# Patient Record
Sex: MALE | Race: WHITE | HISPANIC OR LATINO | Employment: FULL TIME | URBAN - METROPOLITAN AREA
[De-identification: names, ages, dates, MRNs, and addresses within clinical notes are randomized per-mention and may not be internally consistent; named-entity substitution may affect disease eponyms.]

---

## 2017-11-06 ENCOUNTER — HOSPITAL ENCOUNTER (EMERGENCY)
Facility: MEDICAL CENTER | Age: 22
End: 2017-11-06
Attending: EMERGENCY MEDICINE

## 2017-11-06 VITALS
SYSTOLIC BLOOD PRESSURE: 131 MMHG | BODY MASS INDEX: 18.58 KG/M2 | HEART RATE: 92 BPM | RESPIRATION RATE: 16 BRPM | TEMPERATURE: 98.8 F | HEIGHT: 68 IN | DIASTOLIC BLOOD PRESSURE: 72 MMHG | OXYGEN SATURATION: 100 % | WEIGHT: 122.58 LBS

## 2017-11-06 DIAGNOSIS — B35.6 TINEA CRURIS: ICD-10-CM

## 2017-11-06 DIAGNOSIS — B35.4 TINEA CORPORIS: ICD-10-CM

## 2017-11-06 PROCEDURE — 99283 EMERGENCY DEPT VISIT LOW MDM: CPT

## 2017-11-06 RX ORDER — FLUCONAZOLE 200 MG/1
200 TABLET ORAL DAILY
Qty: 4 TAB | Refills: 0 | Status: SHIPPED | OUTPATIENT
Start: 2017-11-06 | End: 2022-01-28

## 2017-11-07 NOTE — DISCHARGE INSTRUCTIONS
Jock Itch  Jock itch is an infection of the skin in the groin area. It is caused by a type of germ (fungus). The infection causes a rash and itching in the groin and upper thigh. It is common in people who play sports. Being in places with hot weather and wearing tight or wet clothes can increase the chance of getting jock itch. The rash usually goes away in 2-3 weeks with treatment.  HOME CARE  · Take medicines only as told by your doctor. Apply skin creams or ointments exactly as told.  · Wear loose-fitting clothing.  ¨ Men should wear cotton boxer shorts.  ¨ Women should wear cotton underwear.  · Change your underwear every day to keep your groin dry.  · Avoid hot baths.  · Dry your groin area well after you take a bath or shower.  ¨ Use a separate towel to dry your groin area. This will help to prevent a spreading of the infection to other areas of your body.  · Do not scratch the affected area.  · Do not share towels with other people.  GET HELP IF:  · Your rash does not improve or it gets worse after 2 weeks of treatment.  · Your rash is spreading.  · Your rash comes back after treatment is finished.  · You have a fever.  · You have redness, swelling, or pain in the area around your rash.  · You have fluid, blood, or pus coming from your rash.  · Your have your rash for more than 4 weeks.     This information is not intended to replace advice given to you by your health care provider. Make sure you discuss any questions you have with your health care provider.     Document Released: 03/14/2011 Document Revised: 01/08/2016 Document Reviewed: 09/29/2015  [employer] Interactive Patient Education ©2016 [employer]  Body Ringworm  Ringworm (tinea corporis) is a fungal infection of the skin on the body. This infection is not caused by worms, but is actually caused by a fungus. Fungus normally lives on the top of your skin and can be useful. However, in the case of ringworms, the fungus grows out of control and  causes a skin infection. It can involve any area of skin on the body and can spread easily from one person to another (contagious). Ringworm is a common problem for children, but it can affect adults as well. Ringworm is also often found in athletes, especially wrestlers who share equipment and mats.   CAUSES   Ringworm of the body is caused by a fungus called dermatophyte. It can spread by:  · Touching other people who are infected.  · Touching infected pets.  · Touching or sharing objects that have been in contact with the infected person or pet (hats, ellis, towels, clothing, sports equipment).  SYMPTOMS   · Itchy, raised red spots and bumps on the skin.  · Ring-shaped rash.  · Redness near the border of the rash with a clear center.  · Dry and scaly skin on or around the rash.  Not every person develops a ring-shaped rash. Some develop only the red, scaly patches.  DIAGNOSIS   Most often, ringworm can be diagnosed by performing a skin exam. Your caregiver may choose to take a skin scraping from the affected area. The sample will be examined under the microscope to see if the fungus is present.   TREATMENT   Body ringworm may be treated with a topical antifungal cream or ointment. Sometimes, an antifungal shampoo that can be used on your body is prescribed. You may be prescribed antifungal medicines to take by mouth if your ringworm is severe, keeps coming back, or lasts a long time.   HOME CARE INSTRUCTIONS   · Only take over-the-counter or prescription medicines as directed by your caregiver.  · Wash the infected area and dry it completely before applying your cream or ointment.  · When using antifungal shampoo to treat the ringworm, leave the shampoo on the body for 3-5 minutes before rinsing.     · Wear loose clothing to stop clothes from rubbing and irritating the rash.  · Wash or change your bed sheets every night while you have the rash.  · Have your pet treated by your  if it has the same  infection.  To prevent ringworm:   · Practice good hygiene.  · Wear sandals or shoes in public places and showers.  · Do not share personal items with others.  · Avoid touching red patches of skin on other people.  · Avoid touching pets that have bald spots or wash your hands after doing so.  SEEK MEDICAL CARE IF:   · Your rash continues to spread after 7 days of treatment.  · Your rash is not gone in 4 weeks.  · The area around your rash becomes red, warm, tender, and swollen.     This information is not intended to replace advice given to you by your health care provider. Make sure you discuss any questions you have with your health care provider.     Document Released: 12/15/2001 Document Revised: 09/11/2013 Document Reviewed: 07/01/2013  Elsevier Interactive Patient Education ©2016 [employer]

## 2017-11-07 NOTE — ED NOTES
Pt to triage  Chief Complaint   Patient presents with   • Rash     all over body, and to R hand, dry areas and small red raised bumps for 1 year   Pt asked to wait in lobby, pt updated on triage process and pt asked to inform RN of any changes.

## 2017-11-07 NOTE — ED PROVIDER NOTES
"ED Provider Note    Scribed for Dr. Drew Pantoja M.D. by Kade Burns. 11/6/2017  4:47 PM    Primary care provider: Pcp Pt States None  Means of arrival: Walk-In  History obtained from: Patient  History limited by: None    CHIEF COMPLAINT  Chief Complaint   Patient presents with   • Rash     all over body, and to R hand, dry areas and small red raised bumps for 1 year     HPI  Wayne Castellanos is a 21 y.o. male who presents to the Emergency Department complaining of a generalized progressively worsening pruritic rash onset 1 year ago. He said the rash originally started on his right foot and has started to spread across his arms towards bilateral hands. Now, he describes pimples to both hands. The patient originally thought the issue was due to mosquitos but is concerned because the rashes are not going away. He adds that they come in waves with the rashes alleviating and then spreading even further. He has tried using Gold Bond lotion without any relief. Denies vomiting, fever.    REVIEW OF SYSTEMS  Pertinent positives include rash, itchiness. Pertinent negatives include no vomiting, fever.  See HPI for further details.    E.    PAST MEDICAL HISTORY       SURGICAL HISTORY  patient denies any surgical history    SOCIAL HISTORY  None noted    FAMILY HISTORY  None noted    CURRENT MEDICATIONS  None noted    ALLERGIES  No Known Allergies    PHYSICAL EXAM  VITAL SIGNS: /73   Pulse (!) 101   Temp 37.1 °C (98.8 °F) (Temporal)   Resp 18   Ht 1.727 m (5' 8\")   Wt 55.6 kg (122 lb 9.2 oz)   SpO2 100%   BMI 18.64 kg/m²     Constitutional: Well developed, Well nourished, No acute distress.   .   Skin: Warm, Dry, Red, scaly lesions in between bilateral fingers and feet. Annular lesions on the trunk. Weeping scaly lesions in the groin which are all representative of tinea corporis.  Musculoskeletal: No major deformities noted.   Neurologic: Awake, alert. Moves all extremities spontaneously.  Psychiatric: Affect " normal, Mood normal.     COURSE & MEDICAL DECISION MAKING  Pertinent Labs & Imaging studies reviewed. (See chart for details)    4:47 PM - Patient seen and examined at bedside. I explained that he has a fungal infection and can be treated with a course of medication to treat his symptoms. The patient was given return precautions such as new or worsening symptoms and he understands and verbalizes agreement.    Decision Making:  I think the rash represents tinea pedis and cruise which is now spread to involve the body as well put on systemic antifungal with fluconazole    The patient will return for new or worsening symptoms and is stable at the time of discharge.    The patient is referred to a primary physician for blood pressure management, diabetic screening, and for all other preventative health concerns.    DISPOSITION:  Patient will be discharged home in stable condition.    FOLLOW UP:  [address] [address]  [phone]        OUTPATIENT MEDICATIONS:  New Prescriptions    FLUCONAZOLE (DIFLUCAN) 200 MG TAB    Take 1 Tab by mouth every day. Take only one  Tablet once a week for 4 weeks     FINAL IMPRESSION  1. Tinea corporis    2. Tinea cruris       Kade JETER (Magdiibsherri), am scribing for, and in the presence of, Drew Pantoja M.D..    Electronically signed by: Kade Burns (Jesusita), 11/6/2017    Drew JETER M.D. personally performed the services described in this documentation, as scribed by Kade Burns in my presence, and it is both accurate and complete.    The note accurately reflects work and decisions made by me.  Drew Pantoja  11/6/2017  5:58 PM

## 2017-11-10 NOTE — ED NOTES
Outpatient pharmacy called to clarify sig of fluconazole, sig clarified to fluconazole 200 mg, take 1 tablet once weekly for 4 weeks.    Teri Green, PharmD, CGP, BCPS

## 2022-01-25 ENCOUNTER — TELEPHONE (OUTPATIENT)
Dept: SCHEDULING | Facility: IMAGING CENTER | Age: 27
End: 2022-01-25

## 2022-01-25 NOTE — TELEPHONE ENCOUNTER
Left message for patient to call back regarding pre-visit planning. Please transfer call to [phone]

## 2022-01-25 NOTE — TELEPHONE ENCOUNTER
NEW PATIENT VISIT PRE-VISIT PLANNING    1.  [employer] Patient is checked in Patient Demographics?Yes    2.  Immunizations were updated in Epic using Reconcile Outside Information activity? Yes         3.  Is this appointment scheduled as a Hospital Follow-Up? No    4.  Patient is due for the following Health Maintenance Topics:   Health Maintenance Due   Topic Date Due   • IMM VARICELLA (CHICKENPOX) VACCINE (1 of 2 - 2-dose childhood series) Never done   • COVID-19 Vaccine (1) Never done   • IMM HPV VACCINE (1 - Male 2-dose series) Never done   • IMM DTaP/Tdap/Td Vaccine (1 - Tdap) Never done   • IMM INFLUENZA (1) Never done     5.  Reviewed/Updated the following with patient:       •   Preferred Pharmacy? Yes       •   Preferred Lab? Yes        •   Preferred Communication? Yes        •   Allergies? No       •   Medications? NO     6.  Updated Care Team?       •   DME Company (gait device, O2, CPAP, etc.) NO       •   Other Specialists (eye doctor, derm, GYN, cardiology, endo, etc): NO    7.  AHA (Puls8) form printed for Provider? N/A

## 2022-01-28 ENCOUNTER — OFFICE VISIT (OUTPATIENT)
Dept: MEDICAL GROUP | Facility: LAB | Age: 27
End: 2022-01-28
Payer: COMMERCIAL

## 2022-01-28 VITALS
HEIGHT: 64 IN | DIASTOLIC BLOOD PRESSURE: 62 MMHG | SYSTOLIC BLOOD PRESSURE: 120 MMHG | OXYGEN SATURATION: 96 % | RESPIRATION RATE: 14 BRPM | WEIGHT: 129.6 LBS | TEMPERATURE: 97.7 F | HEART RATE: 68 BPM | BODY MASS INDEX: 22.13 KG/M2

## 2022-01-28 DIAGNOSIS — R45.89 DEPRESSED MOOD: ICD-10-CM

## 2022-01-28 DIAGNOSIS — B36.0 TINEA VERSICOLOR: ICD-10-CM

## 2022-01-28 DIAGNOSIS — Z31.69 INFERTILITY COUNSELING: ICD-10-CM

## 2022-01-28 DIAGNOSIS — R09.89 CHRONIC THROAT CLEARING: ICD-10-CM

## 2022-01-28 PROCEDURE — 99203 OFFICE O/P NEW LOW 30 MIN: CPT | Performed by: NURSE PRACTITIONER

## 2022-01-28 RX ORDER — KETOCONAZOLE 20 MG/G
1 CREAM TOPICAL DAILY
Qty: 60 G | Refills: 1 | Status: SHIPPED | OUTPATIENT
Start: 2022-01-28 | End: 2022-09-08

## 2022-01-28 RX ORDER — OMEPRAZOLE 20 MG/1
20 CAPSULE, DELAYED RELEASE ORAL DAILY
Qty: 30 CAPSULE | Refills: 4 | Status: SHIPPED | OUTPATIENT
Start: 2022-01-28 | End: 2022-09-08

## 2022-01-28 ASSESSMENT — PATIENT HEALTH QUESTIONNAIRE - PHQ9: CLINICAL INTERPRETATION OF PHQ2 SCORE: 0

## 2022-01-28 NOTE — ASSESSMENT & PLAN NOTE
Chronic condition, new to me. Patient reports that it has been ongoing for about 5 years. He also reports increased burping, bloating. Throat clearing is worse after eating. He denies heartburn, N/V, cough.

## 2022-01-28 NOTE — ASSESSMENT & PLAN NOTE
Onset about 3 years ago. He reports a red rash on his torso and back, and maybe some on his shoulder. He has been seeing it grow lately. Will sometimes itch. Has not tried any topical medication.

## 2022-01-28 NOTE — PATIENT INSTRUCTIONS
Food Choices for Gastroesophageal Reflux Disease, Adult  When you have gastroesophageal reflux disease (GERD), the foods you eat and your eating habits are very important. Choosing the right foods can help ease your discomfort. Think about working with a nutrition specialist (dietitian) to help you make good choices.  What are tips for following this plan?    Meals  · Choose healthy foods that are low in fat, such as fruits, vegetables, whole grains, low-fat dairy products, and lean meat, fish, and poultry.  · Eat small meals often instead of 3 large meals a day. Eat your meals slowly, and in a place where you are relaxed. Avoid bending over or lying down until 2-3 hours after eating.  · Avoid eating meals 2-3 hours before bed.  · Avoid drinking a lot of liquid with meals.  · Cook foods using methods other than frying. Bake, grill, or broil food instead.  · Avoid or limit:  ? Chocolate.  ? Peppermint or spearmint.  ? Alcohol.  ? Pepper.  ? Black and decaffeinated coffee.  ? Black and decaffeinated tea.  ? Bubbly (carbonated) soft drinks.  ? Caffeinated energy drinks and soft drinks.  · Limit high-fat foods such as:  ? Fatty meat or fried foods.  ? Whole milk, cream, butter, or ice cream.  ? Nuts and nut butters.  ? Pastries, donuts, and sweets made with butter or shortening.  · Avoid foods that cause symptoms. These foods may be different for everyone. Common foods that cause symptoms include:  ? Tomatoes.  ? Oranges, tony, and limes.  ? Peppers.  ? Spicy food.  ? Onions and garlic.  ? Vinegar.  Lifestyle  · Maintain a healthy weight. Ask your doctor what weight is healthy for you. If you need to lose weight, work with your doctor to do so safely.  · Exercise for at least 30 minutes for 5 or more days each week, or as told by your doctor.  · Wear loose-fitting clothes.  · Do not smoke. If you need help quitting, ask your doctor.  · Sleep with the head of your bed higher than your feet. Use a wedge under the  mattress or blocks under the bed frame to raise the head of the bed.  Summary  · When you have gastroesophageal reflux disease (GERD), food and lifestyle choices are very important in easing your symptoms.  · Eat small meals often instead of 3 large meals a day. Eat your meals slowly, and in a place where you are relaxed.  · Limit high-fat foods such as fatty meat or fried foods.  · Avoid bending over or lying down until 2-3 hours after eating.  · Avoid peppermint and spearmint, caffeine, alcohol, and chocolate.  This information is not intended to replace advice given to you by your health care provider. Make sure you discuss any questions you have with your health care provider.  Document Released: 06/18/2013 Document Revised: 04/09/2020 Document Reviewed: 01/23/2018  Elsevier Patient Education © 2020 [employer]

## 2022-01-28 NOTE — ASSESSMENT & PLAN NOTE
Patient reports that he and his girlfriend have been trying to get pregnant for about 2 years. Patient reports that his girlfriend is not having regular periods. She is seeing a gynecologist who recommended that he get sperm testing. He is wondering about a referral to a fertility specialist at this time.

## 2022-01-29 NOTE — PROGRESS NOTES
"Subjective:     CC:  Diagnoses of Tinea versicolor, Infertility counseling, Chronic throat clearing, and Depressed mood were pertinent to this visit.    HISTORY OF THE PRESENT ILLNESS: Patient is a 26 y.o. male. This pleasant patient is here today to establish care and discuss the following:    Tinea versicolor  Onset about 3 years ago. He reports a red rash on his torso and back, and maybe some on his shoulder. He has been seeing it grow lately. Will sometimes itch. Has not tried any topical medication.    Infertility counseling  Patient reports that he and his girlfriend have been trying to get pregnant for about 2 years. Patient reports that his girlfriend is not having regular periods. She is seeing a gynecologist who recommended that he get sperm testing. He is wondering about a referral to a fertility specialist at this time.     Chronic throat clearing  Chronic condition, new to me. Patient reports that it has been ongoing for about 5 years. He also reports increased burping, bloating. Throat clearing is worse after eating. He denies heartburn, N/V, cough.    Depressed mood  Patient denies depression, PHQ-9 is 0, but does report a depressed mood sometimes. He would like a referral to psychology at this time.    ROS:   Gen: no fevers/chills, no changes in weight  Eyes: no changes in vision  ENT: no sore throat, no hearing loss, no bloody nose  Pulm: no sob, no cough  CV: no chest pain, no palpitations  GI: no nausea/vomiting, no diarrhea  : no dysuria  MSk: no myalgias  Neuro: no headaches, no numbness/tingling  Heme/Lymph: no easy bruising        - NOTE: All other systems reviewed and are negative, except as in HPI.      Objective:     Exam: /62 (BP Location: Right arm, Patient Position: Sitting, BP Cuff Size: Adult)   Pulse 68   Temp 36.5 °C (97.7 °F)   Resp 14   Ht 1.626 m (5' 4\")   Wt 58.8 kg (129 lb 9.6 oz)   SpO2 96%  Body mass index is 22.25 kg/m².    General: Normal appearing. No " distress.  HEENT: Normocephalic. Eyes conjunctiva clear lids without ptosis, pupils equal and reactive to light accommodation, ears normal shape and contour, canals are clear bilaterally, tympanic membranes are benign, nasal mucosa benign, oropharynx is without erythema, edema or exudates.   Neck: Supple without JVD or bruit. Thyroid is not enlarged.  Pulmonary: Clear to ausculation.  Normal effort. No rales, ronchi, or wheezing.  Cardiovascular: Regular rate and rhythm without murmur. Carotid and radial pulses are intact and equal bilaterally.  Neurologic: Grossly nonfocal  Lymph: No cervical or supraclavicular lymph nodes are palpable  Skin: Warm and dry. Erythematous macules forming some confluent patches on the torso and upper back.  Musculoskeletal: Normal gait. No extremity cyanosis, clubbing, or edema.  Psych: Normal mood and affect. Alert and oriented x3. Judgment and insight is normal.    Assessment & Plan:   26 y.o. male with the following -    1. Tinea versicolor  Patient to use medication as prescribed. Side effects of medication prescribed today were discussed with the patient including how to take the medication and proper dosage. Discussed repercussions of not taking the medication as prescribed. Instructed to call the office should he have any negative side effects or problems with the medication. Referral placed to dermatology. Continue to monitor.  - ketoconazole (NIZORAL) 2 % Cream; Apply 1 Application topically every day.  Dispense: 60 g; Refill: 1  - Referral to Dermatology    2. Infertility counseling  Referral placed to infertility.   - Referral to Infertility    3. Chronic throat clearing  Labs ordered. Patient to take medication as prescribed. Side effects of medication prescribed today were discussed with the patient including how to take the medication and proper dosage. Discussed repercussions of not taking the medication as prescribed. Instructed to call the office should he have any  negative side effects or problems with the medication. No red flags. Continue to monitor.  - omeprazole (PRILOSEC) 20 MG delayed-release capsule; Take 1 Capsule by mouth every day.  Dispense: 30 Capsule; Refill: 4  - CBC WITHOUT DIFFERENTIAL; Future  - Comp Metabolic Panel; Future  - TSH WITH REFLEX TO FT4; Future    4. Depressed mood  Referral placed to psychology.   - Referral to Psychology    Please note that this dictation was created using voice recognition software. I have made every reasonable attempt to correct obvious errors, but I expect that there are errors of grammar and possibly content that I did not discover before finalizing the note.

## 2022-01-29 NOTE — ASSESSMENT & PLAN NOTE
Patient denies depression, PHQ-9 is 0, but does report a depressed mood sometimes. He would like a referral to psychology at this time.

## 2022-02-08 LAB
ALBUMIN SERPL-MCNC: 4.6 G/DL (ref 4.1–5.2)
ALBUMIN/GLOB SERPL: 1.6 {RATIO} (ref 1.2–2.2)
ALP SERPL-CCNC: 87 IU/L (ref 44–121)
ALT SERPL-CCNC: 20 IU/L (ref 0–44)
AST SERPL-CCNC: 15 IU/L (ref 0–40)
BILIRUB SERPL-MCNC: 0.4 MG/DL (ref 0–1.2)
BUN SERPL-MCNC: 14 MG/DL (ref 6–20)
BUN/CREAT SERPL: 18 (ref 9–20)
CALCIUM SERPL-MCNC: 9.5 MG/DL (ref 8.7–10.2)
CHLORIDE SERPL-SCNC: 102 MMOL/L (ref 96–106)
CO2 SERPL-SCNC: 25 MMOL/L (ref 20–29)
CREAT SERPL-MCNC: 0.8 MG/DL (ref 0.76–1.27)
ERYTHROCYTE [DISTWIDTH] IN BLOOD BY AUTOMATED COUNT: 12.5 % (ref 11.6–15.4)
GLOBULIN SER CALC-MCNC: 2.9 G/DL (ref 1.5–4.5)
GLUCOSE SERPL-MCNC: 87 MG/DL (ref 65–99)
HCT VFR BLD AUTO: 48.5 % (ref 37.5–51)
HGB BLD-MCNC: 16.5 G/DL (ref 13–17.7)
MCH RBC QN AUTO: 28.6 PG (ref 26.6–33)
MCHC RBC AUTO-ENTMCNC: 34 G/DL (ref 31.5–35.7)
MCV RBC AUTO: 84 FL (ref 79–97)
NRBC BLD AUTO-RTO: ABNORMAL %
PLATELET # BLD AUTO: 467 X10E3/UL (ref 150–450)
POTASSIUM SERPL-SCNC: 4.2 MMOL/L (ref 3.5–5.2)
PROT SERPL-MCNC: 7.5 G/DL (ref 6–8.5)
RBC # BLD AUTO: 5.77 X10E6/UL (ref 4.14–5.8)
SODIUM SERPL-SCNC: 139 MMOL/L (ref 134–144)
TSH SERPL DL<=0.005 MIU/L-ACNC: 3.76 UIU/ML (ref 0.45–4.5)
WBC # BLD AUTO: 6.1 X10E3/UL (ref 3.4–10.8)

## 2022-02-10 ENCOUNTER — TELEPHONE (OUTPATIENT)
Dept: MEDICAL GROUP | Facility: LAB | Age: 27
End: 2022-02-10

## 2022-02-10 NOTE — TELEPHONE ENCOUNTER
----- Message from MEKHI MontillaPAzraNAzra sent at 2/10/2022 12:28 PM PST -----  Deuce Black,  Overall labs look good - platelets are slightly high - ok to donate platelets if you feel up to it but it's not worrisome,  Please feel free to email with any questions,  Cristy

## 2022-09-08 ENCOUNTER — OFFICE VISIT (OUTPATIENT)
Dept: MEDICAL GROUP | Facility: LAB | Age: 27
End: 2022-09-08
Payer: COMMERCIAL

## 2022-09-08 VITALS
HEART RATE: 64 BPM | BODY MASS INDEX: 23.76 KG/M2 | DIASTOLIC BLOOD PRESSURE: 68 MMHG | HEIGHT: 64 IN | WEIGHT: 139.2 LBS | SYSTOLIC BLOOD PRESSURE: 106 MMHG | RESPIRATION RATE: 14 BRPM | OXYGEN SATURATION: 98 % | TEMPERATURE: 96.9 F

## 2022-09-08 DIAGNOSIS — J31.0 CHRONIC RHINITIS: ICD-10-CM

## 2022-09-08 DIAGNOSIS — Z31.69 INFERTILITY COUNSELING: ICD-10-CM

## 2022-09-08 PROCEDURE — 99214 OFFICE O/P EST MOD 30 MIN: CPT | Performed by: FAMILY MEDICINE

## 2022-09-08 ASSESSMENT — FIBROSIS 4 INDEX: FIB4 SCORE: 0.19

## 2022-09-08 NOTE — PROGRESS NOTES
"Subjective:     CC: Nose problem, infertility    HPI:   Wayne presents today withL    \"Nose problem\"  Chronic rhinorrhea for 5+ years, also itchy  Unknown nasal spray did not help  Tried unknown allergy pill intermittently with improvement but symptoms will return when he stops allergy pills.  No sinus pressure, no fevers  Does feel like mucous dripping into back of throat    Infertility  Trying to conceive with partner for 4+ years, reports her work-up has been negative.  He had referral placed for semen analysis but reports this was expensive.    Medications, past medical history, allergies, and social history have been reviewed and updated.      Objective:       Exam:  /68 (BP Location: Right arm, Patient Position: Sitting, BP Cuff Size: Adult)   Pulse 64   Temp 36.1 °C (96.9 °F)   Resp 14   Ht 1.626 m (5' 4\")   Wt 63.1 kg (139 lb 3.2 oz)   SpO2 98%   BMI 23.89 kg/m²  Body mass index is 23.89 kg/m².    Constitutional: Alert. Well appearing. No distress.  Skin: Warm, dry, good turgor, no visible rashes.  Eye: Equal, round and reactive to light, conjunctiva clear, lids normal.  ENMT: Moist mucous membranes.  Oropharynx is clear.  Bilateral nasal turbinates are boggy and erythematous, clear rhinorrhea present.  Septum is not deviated.  Neuro: Moves all four extremities. No facial droop.  Psych: Answers questions appropriately. Normal affect and mood.    Assessment & Plan:     26 y.o. male with the following -     1. Chronic rhinitis  Chronic issue with rhinorrhea and itching.  He has had improvement with antihistamines but will return when he stops medications.  Recommended he start Zyrtec as well as Flonase daily and continue these indefinitely.  At this point do not see anatomic issue but could consider referral or CT maxillofacial if no improvement.  Discussed no indication for antibiotics at this point.    2. Infertility counseling  Trying to conceive with partner for 4+ years, reports her work-up " has been negative.  He had referral placed for semen analysis but reports this was expensive.  Recommend he further discuss with fertility clinic, can place order for semen analysis at [facility] if this save some cost for him.    Please note that this note was created using voice recognition software.

## 2023-02-22 ENCOUNTER — HOSPITAL ENCOUNTER (OUTPATIENT)
Dept: LAB | Facility: MEDICAL CENTER | Age: 28
End: 2023-02-22
Attending: NURSE PRACTITIONER
Payer: COMMERCIAL

## 2023-02-22 ENCOUNTER — OFFICE VISIT (OUTPATIENT)
Dept: MEDICAL GROUP | Facility: LAB | Age: 28
End: 2023-02-22
Payer: COMMERCIAL

## 2023-02-22 VITALS
OXYGEN SATURATION: 100 % | DIASTOLIC BLOOD PRESSURE: 58 MMHG | HEIGHT: 64 IN | TEMPERATURE: 97.5 F | HEART RATE: 74 BPM | RESPIRATION RATE: 14 BRPM | SYSTOLIC BLOOD PRESSURE: 98 MMHG | BODY MASS INDEX: 23.49 KG/M2 | WEIGHT: 137.6 LBS

## 2023-02-22 DIAGNOSIS — B36.0 TINEA VERSICOLOR: ICD-10-CM

## 2023-02-22 DIAGNOSIS — Z11.3 ENCOUNTER FOR SCREENING EXAMINATION FOR SEXUALLY TRANSMITTED DISEASE: ICD-10-CM

## 2023-02-22 DIAGNOSIS — J34.89 RHINORRHEA: ICD-10-CM

## 2023-02-22 DIAGNOSIS — Z00.00 WELLNESS EXAMINATION: ICD-10-CM

## 2023-02-22 LAB
HBV SURFACE AG SER QL: NORMAL
HCV AB SER QL: NORMAL
HIV 1+2 AB+HIV1 P24 AG SERPL QL IA: NORMAL
T PALLIDUM AB SER QL IA: NORMAL

## 2023-02-22 PROCEDURE — 86803 HEPATITIS C AB TEST: CPT

## 2023-02-22 PROCEDURE — 87340 HEPATITIS B SURFACE AG IA: CPT

## 2023-02-22 PROCEDURE — 36415 COLL VENOUS BLD VENIPUNCTURE: CPT

## 2023-02-22 PROCEDURE — G0475 HIV COMBINATION ASSAY: HCPCS

## 2023-02-22 PROCEDURE — 87491 CHLMYD TRACH DNA AMP PROBE: CPT

## 2023-02-22 PROCEDURE — 87591 N.GONORRHOEAE DNA AMP PROB: CPT

## 2023-02-22 PROCEDURE — 86780 TREPONEMA PALLIDUM: CPT

## 2023-02-22 PROCEDURE — 99395 PREV VISIT EST AGE 18-39: CPT | Performed by: NURSE PRACTITIONER

## 2023-02-22 RX ORDER — KETOCONAZOLE 20 MG/G
1 CREAM TOPICAL DAILY
Qty: 30 G | Refills: 0 | Status: SHIPPED
Start: 2023-02-22 | End: 2023-02-22

## 2023-02-22 ASSESSMENT — FIBROSIS 4 INDEX: FIB4 SCORE: 0.19

## 2023-02-22 ASSESSMENT — PATIENT HEALTH QUESTIONNAIRE - PHQ9: CLINICAL INTERPRETATION OF PHQ2 SCORE: 0

## 2023-02-22 NOTE — ASSESSMENT & PLAN NOTE
Chronic rhinorrhea for 5+ years. Has tried nasal spray in the past without relief. Tried unknown allergy pill intermittently with improvement but symptoms will return when he stops allergy pills.  No sinus pressure, no fevers  Does feel like mucous dripping into back of throat

## 2023-02-22 NOTE — PROGRESS NOTES
Subjective:     CC:   Chief Complaint   Patient presents with    Annual Exam    Runny Nose     Concerns     Sexually Transmitted Diseases     Testing / bad odor      HPI:   Wayne presents today with the following:    Health Maintenance     - Dyslipidemia (30-45): n/a  - Diabetes (HTN, HLD, BMI >25): n/a  - Depression screening (PHQ-2 and/or PHQ-9): 0  - Osteoporosis: Ca intake, DEXA (>65 or with risk factors): n/a  - If fx, needs BMD test or tx w/i 6 months of dx  - Dental: needs to establish with dentist  - Eye: due for appointment  AAA Screening (Once in men 65-75 years who have ever smoked): n/a  Diet: fair  Exercise: regular  Substance Use: none  Tobacco Use/counseling: n/a  Safe in relationship: yes  Seat belts, bike helmet, gun safety discussed.  Sun protection used.    Cancer screening  - Colon CA (45-75) - FIT (annual) cspy (q10yr): n/a  - Lung CA (50-79y/o w/20py smoking hx & currently smoke or quit w/i past 15 yrs): n/a  - Prostate Cancer Screening/PSA: n/a  - Cervical CA (21-65): n/a  -  HX Abnormal pap/HPV: none  - Breast CA: mammo (required 50-73yo) or starting 40 (ACOG, ACR), 45 (ACS), 50 (USPTF): n/a     Infectious disease screening/Immunizations  --STI/HIV Screening: due, has a hx of chlamydia that might not have been treated appropriately, would like testing at this time. Also reports some foul odor after intercourse.  --Practices safe sex.  --Hepatitis C Screening (18 to 78 yo): n/a  --Immunizations:               Influenza: declines   Covid-19: declines              Tetanus: declines              Shingles: n/a    Rhinorrhea  Chronic rhinorrhea for 5+ years. Has tried nasal spray in the past without relief. Tried unknown allergy pill intermittently with improvement but symptoms will return when he stops allergy pills.  No sinus pressure, no fevers  Does feel like mucous dripping into back of throat    ROS:   As documented in history of present illness above    Objective:     Exam: BP 98/58 (BP  "Location: Right arm, Patient Position: Sitting, BP Cuff Size: Adult)   Pulse 74   Temp 36.4 °C (97.5 °F)   Resp 14   Ht 1.626 m (5' 4\")   Wt 62.4 kg (137 lb 9.6 oz)   SpO2 100%  Body mass index is 23.62 kg/m².    General: Normal appearing. No distress.  HEENT: Normocephalic. Eyes conjunctiva clear lids without ptosis, pupils equal and reactive to light accommodation, ears normal shape and contour, canals are clear bilaterally, tympanic membranes are benign, nasal mucosa benign, oropharynx is without erythema, edema or exudates.   Neck: Supple without JVD or bruit. Thyroid is not enlarged.  Pulmonary: Clear to ausculation.  Normal effort. No rales, ronchi, or wheezing.  Cardiovascular: Regular rate and rhythm without murmur. Carotid and radial pulses are intact and equal bilaterally.  Abdomen: Soft, nontender, nondistended. Normal bowel sounds. Liver and spleen are not palpable  Neurologic: Grossly nonfocal  Lymph: No cervical or supraclavicular lymph nodes are palpable  Skin: Warm and dry.  No obvious lesions.  Musculoskeletal: Normal gait. No extremity cyanosis, clubbing, or edema.  Psych: Normal mood and affect. Alert and oriented x3. Judgment and insight is normal.    Assessment & Plan:     27 y.o. male with the following -     1. Wellness examination  This is a healthy 27-year-old male here today for a preventative exam.  Previous medical history, healthcare maintenance and immunization status reviewed.  See discussion of anticipatory guidance below.  Patient will return annually for preventative exams.    Dentist and eye doctor   Labs per orders.    Anticipatory guidance  Counseling about diet, supplements, exercise, skin care and safe sex.    2. Rhinorrhea  Chronic condition. Advised nasal saline and steroid sprays daily.  OTC anti-histamines (Zyrtec) as needed. Encouraged allergen avoidence and environment modification when possible.  Referral placed to allergist.   - Referral to Allergy    3. Encounter " for screening examination for sexually transmitted disease  Labs ordered today.  Will follow-up regarding all results when they return. Discussed the importance of condom use to protect from STDs.    - T.PALLIDUM AB LUL (SCREENING); Future  - HEP C VIRUS ANTIBODY; Future  - HEP B SURFACE ANTIGEN; Future  - Chlamydia/GC, PCR (Urine); Future  - HIV AG/AB COMBO ASSAY DIAGNOSTIC; Future

## 2023-02-23 ENCOUNTER — TELEPHONE (OUTPATIENT)
Dept: MEDICAL GROUP | Facility: LAB | Age: 28
End: 2023-02-23
Payer: COMMERCIAL

## 2023-02-23 LAB
C TRACH DNA SPEC QL NAA+PROBE: NEGATIVE
N GONORRHOEA DNA SPEC QL NAA+PROBE: NEGATIVE
SPECIMEN SOURCE: NORMAL

## 2023-02-23 NOTE — TELEPHONE ENCOUNTER
1. Caller Name: Wayne Castellanos                        Call Back Number: [phone] (home)         How would the patient prefer to be contacted with a response:     Pt would like a call back.

## 2023-02-24 ENCOUNTER — TELEPHONE (OUTPATIENT)
Dept: MEDICAL GROUP | Facility: LAB | Age: 28
End: 2023-02-24
Payer: COMMERCIAL

## 2023-02-24 NOTE — TELEPHONE ENCOUNTER
----- Message from LAVON Genao sent at 2/24/2023  7:58 AM PST -----  Can you please call patient and let him know that STD testing was normal. Thank you

## 2024-01-30 ENCOUNTER — OFFICE VISIT (OUTPATIENT)
Dept: MEDICAL GROUP | Facility: LAB | Age: 29
End: 2024-01-30
Payer: COMMERCIAL

## 2024-01-30 VITALS
OXYGEN SATURATION: 100 % | HEIGHT: 64 IN | BODY MASS INDEX: 22.64 KG/M2 | SYSTOLIC BLOOD PRESSURE: 98 MMHG | RESPIRATION RATE: 14 BRPM | TEMPERATURE: 97.4 F | HEART RATE: 66 BPM | DIASTOLIC BLOOD PRESSURE: 56 MMHG | WEIGHT: 132.6 LBS

## 2024-01-30 DIAGNOSIS — Z31.69 INFERTILITY COUNSELING: Primary | ICD-10-CM

## 2024-01-30 PROCEDURE — 99213 OFFICE O/P EST LOW 20 MIN: CPT | Performed by: NURSE PRACTITIONER

## 2024-01-30 PROCEDURE — 3074F SYST BP LT 130 MM HG: CPT | Performed by: NURSE PRACTITIONER

## 2024-01-30 PROCEDURE — 3078F DIAST BP <80 MM HG: CPT | Performed by: NURSE PRACTITIONER

## 2024-01-30 ASSESSMENT — FIBROSIS 4 INDEX: FIB4 SCORE: 0.2

## 2024-01-30 NOTE — PROGRESS NOTES
I saw the patient with Ana Cristina Gaines, student. I performed a physical exam and medical decision making. I reviewed and verified the documentation on 01/30/24 and agree with the content and plan as written by the student.

## 2024-01-30 NOTE — NON-PROVIDER
"Subjective:     CC:   Chief Complaint   Patient presents with    Follow-Up     Fertility        HPI:   Wayne presents today with the following:    Presents today requesting semen analysis/referral to infertility specialist. He report that he and his significant other have been trying for 5 years without success. He reports speaking with representative from [facility] who explained to him a semen anaylsis could be completed for a less expensive price.   He has insurance, however it will not cover the cost of the visit to specialist. Does report hx of chlymidia in past that resolved with treatment. He reports noticing an odor and discoloration to his semen, explaining a \"watery and yellow\" appearance/consistency. Otherwise, denies any pain with urination, intercourse, or ejaculation. Denies STI testing at this time.     ROS:   Gen: no fevers/chills, no changes in weight  Eyes: no changes in vision  ENT: no sore throat, no hearing loss, no bloody nose  Pulm: no sob, no cough  CV: no chest pain, no palpitations  GI: no nausea/vomiting, no diarrhea  : no dysuria  MSk: no myalgias  Skin: no rash  Neuro: no headaches, no numbness/tingling  Heme/Lymph: no easy bruising          Objective:     Exam: BP 98/56 (BP Location: Right arm, Patient Position: Sitting, BP Cuff Size: Adult)   Pulse 66   Temp 36.3 °C (97.4 °F)   Resp 14   Ht 1.626 m (5' 4\")   Wt 60.1 kg (132 lb 9.6 oz)   SpO2 100%  Body mass index is 22.76 kg/m².    General: Normal appearing. No distress.  HEENT: Normocephalic. Eyes conjunctiva clear lids without ptosis, pupils equal and reactive to light accommodation, ears normal shape and contour, canals are clear bilaterally, tympanic membranes are benign, nasal mucosa benign, oropharynx is without erythema, edema or exudates.   Neck: Supple without JVD or bruit. Thyroid is not enlarged.  Pulmonary: Clear to ausculation.  Normal effort. No rales, ronchi, or " wheezing.  Cardiovascular: Regular rate and rhythm without murmur. Carotid and radial pulses are intact and equal bilaterally.  Abdomen: Soft, nontender, nondistended. Normal bowel sounds. Liver and spleen are not palpable  Neurologic: Grossly nonfocal  Lymph: No cervical or supraclavicular lymph nodes are palpable  Skin: Warm and dry.  No obvious lesions.  Musculoskeletal: Normal gait. No extremity cyanosis, clubbing, or edema.  Psych: Normal mood and affect. Alert and oriented x3. Judgment and insight is normal.    Assessment & Plan:     28 y.o. male with the following -     1. Infertility counseling  Will order semen analysis and send referral to Infertility clinic. Educated patient that report will most likely be sent to clinic, but will notify him of results and forward them to infertility clinic.   - Referral to Infertility  - SEMEN ANALYSIS,POSTVASECTOMY; Future     I spent a total of 30 minutes with record review, exam, communication with the patient, communication with other providers, and documentation of this encounter.

## 2025-02-19 ENCOUNTER — HOSPITAL ENCOUNTER (OUTPATIENT)
Facility: MEDICAL CENTER | Age: 30
End: 2025-02-19
Attending: NURSE PRACTITIONER
Payer: COMMERCIAL

## 2025-02-19 ENCOUNTER — OFFICE VISIT (OUTPATIENT)
Dept: MEDICAL GROUP | Facility: LAB | Age: 30
End: 2025-02-19
Payer: COMMERCIAL

## 2025-02-19 ENCOUNTER — RESULTS FOLLOW-UP (OUTPATIENT)
Dept: MEDICAL GROUP | Facility: LAB | Age: 30
End: 2025-02-19

## 2025-02-19 VITALS
BODY MASS INDEX: 25.44 KG/M2 | WEIGHT: 149 LBS | OXYGEN SATURATION: 96 % | HEIGHT: 64 IN | TEMPERATURE: 97 F | SYSTOLIC BLOOD PRESSURE: 114 MMHG | HEART RATE: 58 BPM | RESPIRATION RATE: 14 BRPM | DIASTOLIC BLOOD PRESSURE: 62 MMHG

## 2025-02-19 DIAGNOSIS — R82.90 FOUL SMELLING URINE: ICD-10-CM

## 2025-02-19 DIAGNOSIS — M54.2 CHRONIC NECK PAIN: ICD-10-CM

## 2025-02-19 DIAGNOSIS — Z00.00 PREVENTATIVE HEALTH CARE: ICD-10-CM

## 2025-02-19 DIAGNOSIS — G89.29 CHRONIC NECK PAIN: ICD-10-CM

## 2025-02-19 DIAGNOSIS — G47.26 SHIFT WORK SLEEP DISORDER: ICD-10-CM

## 2025-02-19 LAB
APPEARANCE UR: CLEAR
BILIRUB UR STRIP-MCNC: NEGATIVE MG/DL
COLOR UR AUTO: YELLOW
GLUCOSE UR STRIP.AUTO-MCNC: NEGATIVE MG/DL
KETONES UR STRIP.AUTO-MCNC: NEGATIVE MG/DL
LEUKOCYTE ESTERASE UR QL STRIP.AUTO: NEGATIVE
NITRITE UR QL STRIP.AUTO: NEGATIVE
PH UR STRIP.AUTO: 7 [PH] (ref 5–8)
PROT UR QL STRIP: NEGATIVE MG/DL
RBC UR QL AUTO: NEGATIVE
SP GR UR STRIP.AUTO: 1.02
UROBILINOGEN UR STRIP-MCNC: 0.2 MG/DL

## 2025-02-19 PROCEDURE — 3078F DIAST BP <80 MM HG: CPT | Performed by: NURSE PRACTITIONER

## 2025-02-19 PROCEDURE — 81002 URINALYSIS NONAUTO W/O SCOPE: CPT | Performed by: NURSE PRACTITIONER

## 2025-02-19 PROCEDURE — 99214 OFFICE O/P EST MOD 30 MIN: CPT | Performed by: NURSE PRACTITIONER

## 2025-02-19 PROCEDURE — 87086 URINE CULTURE/COLONY COUNT: CPT

## 2025-02-19 PROCEDURE — 3074F SYST BP LT 130 MM HG: CPT | Performed by: NURSE PRACTITIONER

## 2025-02-19 RX ORDER — NITROFURANTOIN 25; 75 MG/1; MG/1
100 CAPSULE ORAL 2 TIMES DAILY
Qty: 10 CAPSULE | Refills: 0 | Status: SHIPPED | OUTPATIENT
Start: 2025-02-19

## 2025-02-19 RX ORDER — HYDROXYZINE HYDROCHLORIDE 25 MG/1
25 TABLET, FILM COATED ORAL NIGHTLY PRN
Qty: 30 TABLET | Refills: 3 | Status: SHIPPED | OUTPATIENT
Start: 2025-02-19

## 2025-02-19 NOTE — PROGRESS NOTES
"Subjective:     CC:   Chief Complaint   Patient presents with    Unable to Sleep     Sleep study     Neck Pain     Back of neck / heavy        HPI:   Wayne presents today with the following:  Has had trouble falling asleep for several years complicated by his work schedule. He is working an interrupted overnight schedule with [employer] [phone] and returning [phone] the following morning. Has day time drowsniness which causes him to sleep during the day, feels he has decreased mental clarity. Denies snoring, denies apnea. Sleep hygiene practices include only small meal before bed, hot shower, keeping nick phone out of reach, bedroom dark room with comfortable temperature. Does not drink cafeinated beverages. Has tried taking benedryll.     Couple years of neck discomfort, feels like \"heavy discomfort\" not pain. This feeling is present at all times, has only tried chiropractic visit without improvement. Denies weakness, numbness, tingling in BUE. Feels cracking in neck frequently throughout the day.    Concerned for foul smelling urine, previously screened for STIs and would like additional testing.     Would like routine labs    ROS:         - NOTE: All other systems reviewed and are negative, except as in HPI.    Objective:     Exam: /62 (BP Location: Right arm, Patient Position: Sitting, BP Cuff Size: Adult)   Pulse (!) 58   Temp 36.1 °C (97 °F)   Resp 14   Ht 1.626 m (5' 4\")   Wt 67.6 kg (149 lb)   SpO2 96%   BMI 25.58 kg/m²     General: Normal appearing. No distress.  Pulmonary: Clear to ausculation.  Normal effort. No rales, ronchi, or wheezing.  Cardiovascular: Regular rate and rhythm without murmur. Carotid and radial pulses are intact and equal bilaterally.  Abdomen: Soft, nontender, nondistended. Normal bowel sounds. Liver and spleen are not palpable  Neurologic: Grossly nonfocal  Skin: Warm and dry.  No obvious lesions.  Musculoskeletal: Normal gait. No extremity cyanosis, clubbing, or edema. Neck " paraspinal muscles without spasm, shoulders without spasm, full ROM of neck and shoulders  Psych: Normal mood and affect. Alert and oriented x3. Judgment and insight is normal.    Assessment & Plan:     29 y.o. male with the following -     1. Shift work sleep disorder  Discussed sleep hygiene practices, discussed anxiety associated with sleep, and adjustment to shift work with daytime sleep. Patient would like to trial medication to help with this adjustment of sleeping during the day. New medication education provided.   - hydrOXYzine HCl (ATARAX) 25 MG Tab; Take 1 Tablet by mouth at bedtime as needed (sleep).  Dispense: 30 Tablet; Refill: 3    2. Chronic neck pain  Physical exam reassuring, heavy lifting and bending at work may be contributing. Patient would like to trial PT, referral placed  - Referral to Physical Therapy    3. Preventative health care  Routine labs as below  - CBC WITHOUT DIFFERENTIAL; Future  - Comp Metabolic Panel; Future  - Lipid Profile; Future  - HEMOGLOBIN A1C; Future  - TSH WITH REFLEX TO FT4; Future  - VITAMIN D,25 HYDROXY (DEFICIENCY); Future    4. Foul smelling urine  Concerned for foul smelling urine, he and spouse are trying to conceive, will get UA and treat empirically  - POCT Urinalysis  - URINE CULTURE(NEW); Future  - nitrofurantoin (MACROBID) 100 MG Cap; Take 1 Capsule by mouth 2 times a day.  Dispense: 10 Capsule; Refill: 0

## 2025-02-19 NOTE — TELEPHONE ENCOUNTER
----- Message from Nurse Practitioner LAVON Espinoza sent at 2/19/2025 11:50 AM PST -----  Can you please call the patient and let him know that his urinalysis does not show infection? I will still send it off for culture to make sure.

## 2025-02-21 LAB
BACTERIA UR CULT: NORMAL
SIGNIFICANT IND 70042: NORMAL
SITE SITE: NORMAL
SOURCE SOURCE: NORMAL

## 2025-02-28 ENCOUNTER — HOSPITAL ENCOUNTER (OUTPATIENT)
Dept: LAB | Facility: MEDICAL CENTER | Age: 30
End: 2025-02-28
Attending: NURSE PRACTITIONER
Payer: COMMERCIAL

## 2025-02-28 DIAGNOSIS — Z00.00 PREVENTATIVE HEALTH CARE: ICD-10-CM

## 2025-02-28 LAB
25(OH)D3 SERPL-MCNC: 9 NG/ML (ref 30–100)
ALBUMIN SERPL BCP-MCNC: 4.2 G/DL (ref 3.2–4.9)
ALBUMIN/GLOB SERPL: 1.5 G/DL
ALP SERPL-CCNC: 79 U/L (ref 30–99)
ALT SERPL-CCNC: 25 U/L (ref 2–50)
ANION GAP SERPL CALC-SCNC: 11 MMOL/L (ref 7–16)
AST SERPL-CCNC: 23 U/L (ref 12–45)
BILIRUB SERPL-MCNC: 0.3 MG/DL (ref 0.1–1.5)
BUN SERPL-MCNC: 20 MG/DL (ref 8–22)
CALCIUM ALBUM COR SERPL-MCNC: 9.2 MG/DL (ref 8.5–10.5)
CALCIUM SERPL-MCNC: 9.4 MG/DL (ref 8.5–10.5)
CHLORIDE SERPL-SCNC: 101 MMOL/L (ref 96–112)
CHOLEST SERPL-MCNC: 221 MG/DL (ref 100–199)
CO2 SERPL-SCNC: 25 MMOL/L (ref 20–33)
CREAT SERPL-MCNC: 0.77 MG/DL (ref 0.5–1.4)
ERYTHROCYTE [DISTWIDTH] IN BLOOD BY AUTOMATED COUNT: 37.9 FL (ref 35.9–50)
EST. AVERAGE GLUCOSE BLD GHB EST-MCNC: 120 MG/DL
FASTING STATUS PATIENT QL REPORTED: NORMAL
GFR SERPLBLD CREATININE-BSD FMLA CKD-EPI: 124 ML/MIN/1.73 M 2
GLOBULIN SER CALC-MCNC: 2.8 G/DL (ref 1.9–3.5)
GLUCOSE SERPL-MCNC: 90 MG/DL (ref 65–99)
HBA1C MFR BLD: 5.8 % (ref 4–5.6)
HCT VFR BLD AUTO: 47.3 % (ref 42–52)
HDLC SERPL-MCNC: 73 MG/DL
HGB BLD-MCNC: 15.8 G/DL (ref 14–18)
LDLC SERPL CALC-MCNC: 126 MG/DL
MCH RBC QN AUTO: 28.3 PG (ref 27–33)
MCHC RBC AUTO-ENTMCNC: 33.4 G/DL (ref 32.3–36.5)
MCV RBC AUTO: 84.6 FL (ref 81.4–97.8)
PLATELET # BLD AUTO: 426 K/UL (ref 164–446)
PMV BLD AUTO: 9 FL (ref 9–12.9)
POTASSIUM SERPL-SCNC: 4.1 MMOL/L (ref 3.6–5.5)
PROT SERPL-MCNC: 7 G/DL (ref 6–8.2)
RBC # BLD AUTO: 5.59 M/UL (ref 4.7–6.1)
SODIUM SERPL-SCNC: 137 MMOL/L (ref 135–145)
TRIGL SERPL-MCNC: 111 MG/DL (ref 0–149)
TSH SERPL DL<=0.005 MIU/L-ACNC: 2.7 UIU/ML (ref 0.38–5.33)
WBC # BLD AUTO: 6.8 K/UL (ref 4.8–10.8)

## 2025-02-28 PROCEDURE — 82306 VITAMIN D 25 HYDROXY: CPT

## 2025-02-28 PROCEDURE — 84443 ASSAY THYROID STIM HORMONE: CPT

## 2025-02-28 PROCEDURE — 36415 COLL VENOUS BLD VENIPUNCTURE: CPT

## 2025-02-28 PROCEDURE — 80053 COMPREHEN METABOLIC PANEL: CPT

## 2025-02-28 PROCEDURE — 85027 COMPLETE CBC AUTOMATED: CPT

## 2025-02-28 PROCEDURE — 83036 HEMOGLOBIN GLYCOSYLATED A1C: CPT

## 2025-02-28 PROCEDURE — 80061 LIPID PANEL: CPT

## 2025-05-21 ENCOUNTER — PHYSICAL THERAPY (OUTPATIENT)
Dept: PHYSICAL THERAPY | Facility: MEDICAL CENTER | Age: 30
End: 2025-05-21
Attending: NURSE PRACTITIONER
Payer: COMMERCIAL

## 2025-05-21 DIAGNOSIS — M54.2 CHRONIC NECK PAIN: Primary | ICD-10-CM

## 2025-05-21 DIAGNOSIS — G89.29 CHRONIC NECK PAIN: Primary | ICD-10-CM

## 2025-05-21 PROCEDURE — 97014 ELECTRIC STIMULATION THERAPY: CPT

## 2025-05-21 PROCEDURE — 97140 MANUAL THERAPY 1/> REGIONS: CPT

## 2025-05-21 PROCEDURE — 97162 PT EVAL MOD COMPLEX 30 MIN: CPT

## 2025-05-21 ASSESSMENT — ENCOUNTER SYMPTOMS
PAIN LOCATION: POSTERIOR NECK
PAIN SCALE AT LOWEST: 4
PAIN SCALE: 5

## 2025-05-21 NOTE — OP THERAPY EVALUATION
"  Outpatient Physical Therapy  INITIAL EVALUATION    [facility]  [address]  [place] [address]  Phone:  [phone]  Fax:  [phone]    Date of Evaluation: 05/21/2025    Patient: Wayne Castellanos  YOB: 1995  MRN: [id number]     Referring Provider: LAVON Genao  [address] [address]  [place] [address]   Referring Diagnosis Chronic neck pain [M54.2, G89.29]     Time Calculation  Start time: 0902  Stop time: 0958 Time Calculation (min): 56 minutes         Chief Complaint: Neck Problem    Visit Diagnoses     ICD-10-CM   1. Chronic neck pain  M54.2    G89.29       Date of onset of impairment: No data found    Subjective:   History of Present Illness:     Mechanism of injury:  Patient is a 29 year old male with a PMH including: None. Pt stating he was seen by chiropractics and x-ray showing minor injury from childhood.     Pt presents to therapy with complaints of neck pain. He describes this as \"heavy discomfort\" and feel tense. This started 3 years ago without clear RADHA. Stating he started [employer] 4 years ago and wonders if this could be attributed to work. Feels these symptoms are impacting his daily life. S/s are currently stable. Symptoms are located posteriorly at neck; symptoms can move down to lower back. Denies symptoms in BUE's; denies n/t.     Per PCP note on 2/19/25, \"Couple years of neck discomfort, feels like \"heavy discomfort\" not pain. This feeling is present at all times, has only tried chiropractic visit without improvement. Denies weakness, numbness, tingling in BUE. Feels cracking in neck frequently throughout the day.\"    Currently denies changes in bowel and bladder, saddle anesthesia, significant weight changes, chills/night sweats, nausea and vomiting, fever, and unexplained fatigue. Denies history of cancer. Pt consents to evaluation and treatment today.           Sleep disturbance:  " "Interrupted sleep  Pain:     Current pain ratin    At best pain ratin    Location:  Posterior neck    Progression:  Stable    Pain Comments::  Aggravating: interrupted sleep nightly, prolonged sitting>1 min, movement of the neck    Relieving: moving around, lying down or having head supported  Diagnostic Tests:     Diagnostic Tests Comments:  Imaging not readily available to PT (completed by chiropractics)  Treatments:     Treatment Comments:  Chiropractics -not helpful   Activities of Daily Living:     Patient reported ADL status: Patient's current daily routine includes:  Work: [employer]  Hobbies: None   Exercise: No exercise routine in place      Patient Goals:     Patient goals for therapy:  Decreased pain      Past Medical History[1]  Past Surgical History[2]  Social History     Tobacco Use    Smoking status: Never    Smokeless tobacco: Never   Substance Use Topics    Alcohol use: Yes     Alcohol/week: 30.0 oz     Types: 50 Standard drinks or equivalent per week     Comment: only on saturday     Social Hx - Family and Occupational[3]    Objective     Postural Observations  Seated posture: poor    Additional Postural Observation Details  Forward head and rounded shoulders    Shoulder Screen    Shoulder active range of motion within functional limits.  Shoulder range of motion within functional limits with the following exceptions: Thoracic screen: WFL     Palpation     Additional Palpation Details  TTP at R>L UT and levator scap (TrP's present with referral to neck) *asterisk sign    Active Range of Motion     Cervical Spine   Flexion: decreased (unchanged)  Extension: decreased (34 deg; \"don't want to go further\")  Retraction: within functional limits  Left lateral flexion: Active left cervical lateral flexion: 30 deg.  Right lateral flexion: Active right cervical lateral flexion: 32 deg.  Left rotation: Active left cervical rotation: 50 deg.  Right rotation: Active right cervical rotation: 55 deg.    Joint " Play   Spine     Central PA McConnell        C2: WFL       C3: WFL       C4: WFL       C5: WFL       C6: WFL       C7: WFL          Therapeutic Exercises (CPT 68492):     1. pt education, re: PT exam findings and upcoming POC    2. new hep as below      Therapeutic Exercise Summary: Access Code: [email or web address]  URL: [email or web address]  Date: 05/21/2025  Prepared by: Manjit Munoz    Exercises  - Seated Upper Trapezius Stretch  - 2 x daily - 7 x weekly - 2-3 sets - 30 sec hold  - Seated Levator Scapulae Stretch  - 2 x daily - 7 x weekly - 2-3 sets - 30 sec hold    Pt performed these exercises with instruction and SPV.  Provided handout with these exercises for daily HEP.        Therapeutic Treatments and Modalities:     1. Manual Therapy (CPT 77214), see below, x6 min    2. E Stim Unattended (CPT 49343), IFC and mhp to c/s and UT x 15 min, pain management    Therapeutic Treatment and Modalities Summary: Manual:  STM to B UT and levator scap  Scapular release and inf/abd mobs gr III in SL with pillow barrier    Time-based treatments/modalities:    Physical Therapy Timed Treatment Charges  Manual therapy minutes (CPT 34751): 6 minutes  Therapeutic exercise minutes (CPT 48849): 5 minutes      Assessment, Response and Plan:   Impairments: activity intolerance, lacks appropriate home exercise program and pain with function    Assessment details:  Patient is a pleasant and cooperative 29 year old male who presents to therapy with c/o neck pain x 3 years; attributes this possibly to working as a . Symptoms are stable. Imaging completed with chiropractics; not available to PT. Pt reporting he may have had a mild injury in childhood. Exam findings suggestive of TrP's at levator scap and UT with concurrent limitations in scapular mobility R>L, poor postural awareness, and periscapular weakness.  Pt may benefit from skilled physical therapy in order to address above impairments in order to improve QOL and return  to reported ADL's.     Prognosis: good    Goals:   Short Term Goals:   1. Pt will be independent with written HEP.      Short term goal time span:  2-4 weeks      Long Term Goals:    1. Pt will be independent with written HEP.  2. Pt will have a sig improvement in NDI score (eval:10)  3. Pt will be able to tolerate full work shift without increase in s/s consistently.  4. Pt will have 75% or greater improvement in sleep hygiene.       Long term goal time span:  6-8 weeks    Plan:   Therapy options:  Physical therapy treatment to continue  Planned therapy interventions:  Neuromuscular Re-education (CPT 03659), E Stim Unattended (CPT 32003), Therapeutic Exercise (CPT 88035), Therapeutic Activities (CPT 09320), Manual Therapy (CPT 44223) and Mechanical Traction (CPT 94261)  Frequency: 1-2x/wk.  Discussed with:  Patient  Plan details:  UPOC: 7/18/25          Functional Assessment Used  Neck Disability Total: 10     Referring provider co-signature:  I have reviewed this plan of care and my co-signature certifies the need for services.    Certification Period: 05/21/2025 to  7/18/25    Physician Signature: ________________________________ Date: ______________                      [1] No past medical history on file.  [2] No past surgical history on file.  [3]   Family and Occupational History  Socioeconomic History    Marital status: Single     Spouse name: Not on file    Number of children: Not on file    Years of education: Not on file    Highest education level: Not on file   Occupational History    Not on file

## 2025-05-28 ENCOUNTER — PHYSICAL THERAPY (OUTPATIENT)
Dept: PHYSICAL THERAPY | Facility: MEDICAL CENTER | Age: 30
End: 2025-05-28
Attending: NURSE PRACTITIONER
Payer: COMMERCIAL

## 2025-05-28 DIAGNOSIS — M54.2 CHRONIC NECK PAIN: Primary | ICD-10-CM

## 2025-05-28 DIAGNOSIS — G89.29 CHRONIC NECK PAIN: Primary | ICD-10-CM

## 2025-05-28 PROCEDURE — 97110 THERAPEUTIC EXERCISES: CPT

## 2025-05-28 PROCEDURE — 97140 MANUAL THERAPY 1/> REGIONS: CPT

## 2025-06-02 ENCOUNTER — APPOINTMENT (OUTPATIENT)
Dept: PHYSICAL THERAPY | Facility: MEDICAL CENTER | Age: 30
End: 2025-06-02
Attending: NURSE PRACTITIONER
Payer: COMMERCIAL

## 2025-06-04 ENCOUNTER — APPOINTMENT (OUTPATIENT)
Dept: PHYSICAL THERAPY | Facility: MEDICAL CENTER | Age: 30
End: 2025-06-04
Attending: NURSE PRACTITIONER
Payer: COMMERCIAL

## 2025-06-10 NOTE — OP THERAPY DAILY TREATMENT
Outpatient Physical Therapy  DAILY TREATMENT     [facility]  [address]  [place] [address]  Phone:  [phone]  Fax:  [phone]    Date: 06/11/2025    Patient: Wayne Castellanos  YOB: 1995  MRN: [id number]     Time Calculation    Start time: 0903  Stop time: 1002 Time Calculation (min): 59 minutes         Chief Complaint: Neck Problem    Visit #: 3    SUBJECTIVE:  Pt reports the exercises help during but with min improvements following. Is experiencing about a 9/10 today.       OBJECTIVE:  Current objective measures:     Forward head and rounded shoulders in sitting  Hypomobility throughout t/s      Therapeutic Exercises (CPT 75425):     1. UBE, x5 min L1, cardiovascular warm up    2. new hep as below    3. seated UT stretch, verbal review    4. seated levator scap stretch, verbal reivew    5. open books hooklying, x10, hep    7. cervical retractions seated->supine lying, x10, poor tolerance in lying; soreness in lying-VC's to complete gently to about 50% effort with some improvements; hep    8. supine flasher with c/s retraction, x5, 20 sec, hep    9. PPU's t/s exmphasis, x10, incr soreness    12. pull downs, PTB, x10, fatigue; visible muscle juddering    13. scapular clocks, x15    20. UPOC: 6/21/25      Therapeutic Exercise Summary: Access Code: [email or web address]  URL: [email or web address]  Date: 05/21/2025  Prepared by: Manjit Munoz    Exercises  - Seated Upper Trapezius Stretch  - 2 x daily - 7 x weekly - 2-3 sets - 30 sec hold  - Seated Levator Scapulae Stretch  - 2 x daily - 7 x weekly - 2-3 sets - 30 sec hold    Pt performed these exercises with instruction and SPV.  Provided handout with these exercises for daily HEP.        Therapeutic Treatments and Modalities:     1. Manual Therapy (CPT 06903), see below, x12 min    2. E Stim Unattended (CPT 74656), IFC and mhp to c/s and UT x 15 min, pain management     Therapeutic Treatment and Modalities Summary: Manual:  Educated pt re: side effects from cupping including bruising, erythema, swelling, tenderness that may last several days. Educated pt re: purpose of treatment. Pt verbalized consent to treat.   Pt draped appropriately with 4 cups applied to B UT and levator scap x 5 min (mini squatting performed with cups adhered)  //erythema following treatment at all sites; no other adverse effects following treatment.        Time-based treatments/modalities:    Physical Therapy Timed Treatment Charges  Manual therapy minutes (CPT 89168): 6 minutes  Therapeutic exercise minutes (CPT 47764): 38 minutes    Pain rating (1-10) before treatment:  5-6/10 neck   Pain rating (1-10) after treatment:  3/10      ASSESSMENT:   Response to treatment:   No significant improvements with PT exercises following completion of hep. High pain reported at every session without long-term improvements. Did notice some improvements with cupping; may repeat this if lasting results. Some transient dizziness that resolved following standing x 30-60 sec.     Long term, If no significant improvements in follow up will recommend to follow up with PCP to discuss pain management, potentially trigger point injections or potentially trial of acupuncture.       PLAN/RECOMMENDATIONS:   Plan for treatment: therapy treatment to continue next visit.  Planned interventions for next visit: continue with current treatment. Assess response to cupping

## 2025-06-11 ENCOUNTER — PHYSICAL THERAPY (OUTPATIENT)
Dept: PHYSICAL THERAPY | Facility: MEDICAL CENTER | Age: 30
End: 2025-06-11
Attending: NURSE PRACTITIONER
Payer: COMMERCIAL

## 2025-06-11 DIAGNOSIS — G89.29 CHRONIC NECK PAIN: Primary | ICD-10-CM

## 2025-06-11 DIAGNOSIS — M54.2 CHRONIC NECK PAIN: Primary | ICD-10-CM

## 2025-06-11 PROCEDURE — 97014 ELECTRIC STIMULATION THERAPY: CPT

## 2025-06-11 PROCEDURE — 97110 THERAPEUTIC EXERCISES: CPT

## 2025-06-16 ENCOUNTER — APPOINTMENT (OUTPATIENT)
Dept: PHYSICAL THERAPY | Facility: MEDICAL CENTER | Age: 30
End: 2025-06-16
Attending: NURSE PRACTITIONER
Payer: COMMERCIAL

## 2025-06-16 NOTE — OP THERAPY DAILY TREATMENT
Outpatient Physical Therapy  DAILY TREATMENT     [facility] Outpatient Physical Therapy  [address]  [place] [address]  Phone:  [phone]  Fax:  [phone]    Date: 06/16/2025    Patient: Wayne Castellanos  YOB: 1995  MRN: [id number]     Time Calculation                   Chief Complaint: No chief complaint on file.    Visit #: 4    SUBJECTIVE:  Pt reports the exercises help during but with min improvements following. Is experiencing about a 9/10 today.       OBJECTIVE:  Current objective measures:     Forward head and rounded shoulders in sitting  Hypomobility throughout t/s      Therapeutic Exercises (CPT 97635):     1. UBE, x5 min L1, cardiovascular warm up    2. new hep as below    3. seated UT stretch, verbal review    4. seated levator scap stretch, verbal reivew    5. open books hooklying, x10, hep    7. cervical retractions seated->supine lying, x10, poor tolerance in lying; soreness in lying-VC's to complete gently to about 50% effort with some improvements; hep    8. supine flasher with c/s retraction, x5, 20 sec, hep    9. PPU's t/s exmphasis, x10, incr soreness    12. pull downs, PTB, x10, fatigue; visible muscle juddering    13. scapular clocks, x15    20. UPOC: 6/21/25      Therapeutic Exercise Summary: Access Code: [email or web address]  URL: [email or web address]  Date: 05/21/2025  Prepared by: Manjit Munoz    Exercises  - Seated Upper Trapezius Stretch  - 2 x daily - 7 x weekly - 2-3 sets - 30 sec hold  - Seated Levator Scapulae Stretch  - 2 x daily - 7 x weekly - 2-3 sets - 30 sec hold    Pt performed these exercises with instruction and SPV.  Provided handout with these exercises for daily HEP.        Therapeutic Treatments and Modalities:     1. Manual Therapy (CPT 84244), see below, x12 min    2. E Stim Unattended (CPT 52054), IFC and mhp to c/s and UT x 15 min, pain management    Therapeutic Treatment and Modalities Summary:  Manual:  Educated pt re: side effects from cupping including bruising, erythema, swelling, tenderness that may last several days. Educated pt re: purpose of treatment. Pt verbalized consent to treat.   Pt draped appropriately with 4 cups applied to B UT and levator scap x 5 min (mini squatting performed with cups adhered)  //erythema following treatment at all sites; no other adverse effects following treatment.        Time-based treatments/modalities:         Pain rating (1-10) before treatment:  5-6/10 neck   Pain rating (1-10) after treatment:  3/10      ASSESSMENT:   Response to treatment:   No significant improvements with PT exercises following completion of hep. High pain reported at every session without long-term improvements. Did notice some improvements with cupping; may repeat this if lasting results. Some transient dizziness that resolved following standing x 30-60 sec.     Long term, If no significant improvements in follow up will recommend to follow up with PCP to discuss pain management, potentially trigger point injections or potentially trial of acupuncture.       PLAN/RECOMMENDATIONS:   Plan for treatment: therapy treatment to continue next visit.  Planned interventions for next visit: continue with current treatment. Assess response to cupping

## 2025-06-18 ENCOUNTER — APPOINTMENT (OUTPATIENT)
Dept: PHYSICAL THERAPY | Facility: MEDICAL CENTER | Age: 30
End: 2025-06-18
Attending: NURSE PRACTITIONER
Payer: COMMERCIAL

## 2025-06-18 NOTE — OP THERAPY DAILY TREATMENT
Outpatient Physical Therapy  DAILY TREATMENT     [facility] Outpatient Physical Therapy  [address]  [place] [address]  Phone:  [phone]  Fax:  [phone]    Date: 06/18/2025    Patient: Wayne Castellanos  YOB: 1995  MRN: [id number]     Time Calculation                   Chief Complaint: No chief complaint on file.    Visit #: 4    SUBJECTIVE:  Pt reports the exercises help during but with min improvements following. Is experiencing about a 9/10 today.       OBJECTIVE:  Current objective measures:     Forward head and rounded shoulders in sitting  Hypomobility throughout t/s      Therapeutic Exercises (CPT 73958):     1. UBE, x5 min L1, cardiovascular warm up    2. new hep as below    3. seated UT stretch, verbal review    4. seated levator scap stretch, verbal reivew    5. open books hooklying, x10, hep    7. cervical retractions seated->supine lying, x10, poor tolerance in lying; soreness in lying-VC's to complete gently to about 50% effort with some improvements; hep    8. supine flasher with c/s retraction, x5, 20 sec, hep    9. PPU's t/s exmphasis, x10, incr soreness    12. pull downs, PTB, x10, fatigue; visible muscle juddering    13. scapular clocks, x15    20. UPOC: 6/21/25      Therapeutic Exercise Summary: Access Code: [email or web address]  URL: [email or web address]  Date: 05/21/2025  Prepared by: Manjit Munoz    Exercises  - Seated Upper Trapezius Stretch  - 2 x daily - 7 x weekly - 2-3 sets - 30 sec hold  - Seated Levator Scapulae Stretch  - 2 x daily - 7 x weekly - 2-3 sets - 30 sec hold    Pt performed these exercises with instruction and SPV.  Provided handout with these exercises for daily HEP.        Therapeutic Treatments and Modalities:     1. Manual Therapy (CPT 58532), see below, x12 min    2. E Stim Unattended (CPT 90197), IFC and mhp to c/s and UT x 15 min, pain management    Therapeutic Treatment and Modalities Summary:  Manual:  Educated pt re: side effects from cupping including bruising, erythema, swelling, tenderness that may last several days. Educated pt re: purpose of treatment. Pt verbalized consent to treat.   Pt draped appropriately with 4 cups applied to B UT and levator scap x 5 min (mini squatting performed with cups adhered)  //erythema following treatment at all sites; no other adverse effects following treatment.        Time-based treatments/modalities:         Pain rating (1-10) before treatment:  5-6/10 neck   Pain rating (1-10) after treatment:  3/10      ASSESSMENT:   Response to treatment:   No significant improvements with PT exercises following completion of hep. High pain reported at every session without long-term improvements. Did notice some improvements with cupping; may repeat this if lasting results. Some transient dizziness that resolved following standing x 30-60 sec.     Long term, If no significant improvements in follow up will recommend to follow up with PCP to discuss pain management, potentially trigger point injections or potentially trial of acupuncture.       PLAN/RECOMMENDATIONS:   Plan for treatment: therapy treatment to continue next visit.  Planned interventions for next visit: continue with current treatment. Assess response to cupping

## 2025-06-23 ENCOUNTER — APPOINTMENT (OUTPATIENT)
Dept: PHYSICAL THERAPY | Facility: MEDICAL CENTER | Age: 30
End: 2025-06-23
Attending: NURSE PRACTITIONER
Payer: COMMERCIAL

## 2025-06-25 ENCOUNTER — APPOINTMENT (OUTPATIENT)
Dept: PHYSICAL THERAPY | Facility: MEDICAL CENTER | Age: 30
End: 2025-06-25
Attending: NURSE PRACTITIONER
Payer: COMMERCIAL

## 2025-06-30 ENCOUNTER — APPOINTMENT (OUTPATIENT)
Dept: PHYSICAL THERAPY | Facility: MEDICAL CENTER | Age: 30
End: 2025-06-30
Attending: NURSE PRACTITIONER
Payer: COMMERCIAL

## 2025-07-02 ENCOUNTER — HOSPITAL ENCOUNTER (EMERGENCY)
Facility: MEDICAL CENTER | Age: 30
End: 2025-07-02
Attending: EMERGENCY MEDICINE
Payer: COMMERCIAL

## 2025-07-02 VITALS
BODY MASS INDEX: 24.88 KG/M2 | TEMPERATURE: 98.8 F | WEIGHT: 145.72 LBS | RESPIRATION RATE: 16 BRPM | HEART RATE: 104 BPM | OXYGEN SATURATION: 96 % | DIASTOLIC BLOOD PRESSURE: 84 MMHG | HEIGHT: 64 IN | SYSTOLIC BLOOD PRESSURE: 133 MMHG

## 2025-07-02 DIAGNOSIS — R19.7 DIARRHEA OF PRESUMED INFECTIOUS ORIGIN: Primary | ICD-10-CM

## 2025-07-02 DIAGNOSIS — E87.6 HYPOKALEMIA: ICD-10-CM

## 2025-07-02 LAB
ALBUMIN SERPL BCP-MCNC: 4.2 G/DL (ref 3.2–4.9)
ALBUMIN/GLOB SERPL: 1.6 G/DL
ALP SERPL-CCNC: 76 U/L (ref 30–99)
ALT SERPL-CCNC: 20 U/L (ref 2–50)
ANION GAP SERPL CALC-SCNC: 14 MMOL/L (ref 7–16)
AST SERPL-CCNC: 24 U/L (ref 12–45)
BASOPHILS # BLD AUTO: 0.3 % (ref 0–1.8)
BASOPHILS # BLD: 0.02 K/UL (ref 0–0.12)
BILIRUB SERPL-MCNC: 0.3 MG/DL (ref 0.1–1.5)
BUN SERPL-MCNC: 19 MG/DL (ref 8–22)
CALCIUM ALBUM COR SERPL-MCNC: 8.6 MG/DL (ref 8.5–10.5)
CALCIUM SERPL-MCNC: 8.8 MG/DL (ref 8.5–10.5)
CHLORIDE SERPL-SCNC: 101 MMOL/L (ref 96–112)
CO2 SERPL-SCNC: 20 MMOL/L (ref 20–33)
CREAT SERPL-MCNC: 0.82 MG/DL (ref 0.5–1.4)
EOSINOPHIL # BLD AUTO: 0.12 K/UL (ref 0–0.51)
EOSINOPHIL NFR BLD: 1.9 % (ref 0–6.9)
ERYTHROCYTE [DISTWIDTH] IN BLOOD BY AUTOMATED COUNT: 35.8 FL (ref 35.9–50)
GFR SERPLBLD CREATININE-BSD FMLA CKD-EPI: 122 ML/MIN/1.73 M 2
GLOBULIN SER CALC-MCNC: 2.7 G/DL (ref 1.9–3.5)
GLUCOSE SERPL-MCNC: 87 MG/DL (ref 65–99)
HCT VFR BLD AUTO: 44.7 % (ref 42–52)
HGB BLD-MCNC: 15.5 G/DL (ref 14–18)
IMM GRANULOCYTES # BLD AUTO: 0.03 K/UL (ref 0–0.11)
IMM GRANULOCYTES NFR BLD AUTO: 0.5 % (ref 0–0.9)
LIPASE SERPL-CCNC: 33 U/L (ref 11–82)
LYMPHOCYTES # BLD AUTO: 1.44 K/UL (ref 1–4.8)
LYMPHOCYTES NFR BLD: 22.4 % (ref 22–41)
MCH RBC QN AUTO: 28.4 PG (ref 27–33)
MCHC RBC AUTO-ENTMCNC: 34.7 G/DL (ref 32.3–36.5)
MCV RBC AUTO: 82 FL (ref 81.4–97.8)
MONOCYTES # BLD AUTO: 0.66 K/UL (ref 0–0.85)
MONOCYTES NFR BLD AUTO: 10.3 % (ref 0–13.4)
NEUTROPHILS # BLD AUTO: 4.15 K/UL (ref 1.82–7.42)
NEUTROPHILS NFR BLD: 64.6 % (ref 44–72)
NRBC # BLD AUTO: 0 K/UL
NRBC BLD-RTO: 0 /100 WBC (ref 0–0.2)
PLATELET # BLD AUTO: 373 K/UL (ref 164–446)
PMV BLD AUTO: 8.5 FL (ref 9–12.9)
POTASSIUM SERPL-SCNC: 3.3 MMOL/L (ref 3.6–5.5)
PROT SERPL-MCNC: 6.9 G/DL (ref 6–8.2)
RBC # BLD AUTO: 5.45 M/UL (ref 4.7–6.1)
SODIUM SERPL-SCNC: 135 MMOL/L (ref 135–145)
WBC # BLD AUTO: 6.4 K/UL (ref 4.8–10.8)

## 2025-07-02 PROCEDURE — A9270 NON-COVERED ITEM OR SERVICE: HCPCS | Performed by: EMERGENCY MEDICINE

## 2025-07-02 PROCEDURE — 94760 N-INVAS EAR/PLS OXIMETRY 1: CPT

## 2025-07-02 PROCEDURE — 700102 HCHG RX REV CODE 250 W/ 637 OVERRIDE(OP): Performed by: EMERGENCY MEDICINE

## 2025-07-02 PROCEDURE — 99283 EMERGENCY DEPT VISIT LOW MDM: CPT

## 2025-07-02 PROCEDURE — 83690 ASSAY OF LIPASE: CPT

## 2025-07-02 PROCEDURE — 80053 COMPREHEN METABOLIC PANEL: CPT

## 2025-07-02 PROCEDURE — 36415 COLL VENOUS BLD VENIPUNCTURE: CPT

## 2025-07-02 PROCEDURE — 85025 COMPLETE CBC W/AUTO DIFF WBC: CPT

## 2025-07-02 RX ORDER — LOPERAMIDE HYDROCHLORIDE 2 MG/1
4 CAPSULE ORAL ONCE
Status: COMPLETED | OUTPATIENT
Start: 2025-07-02 | End: 2025-07-02

## 2025-07-02 RX ORDER — LOPERAMIDE HYDROCHLORIDE 2 MG/1
2 CAPSULE ORAL 4 TIMES DAILY PRN
Qty: 30 CAPSULE | Refills: 0 | Status: SHIPPED | OUTPATIENT
Start: 2025-07-02

## 2025-07-02 RX ORDER — POTASSIUM CHLORIDE 1500 MG/1
40 TABLET, EXTENDED RELEASE ORAL ONCE
Status: COMPLETED | OUTPATIENT
Start: 2025-07-02 | End: 2025-07-02

## 2025-07-02 RX ADMIN — LOPERAMIDE HYDROCHLORIDE 4 MG: 2 CAPSULE ORAL at 23:31

## 2025-07-02 RX ADMIN — POTASSIUM CHLORIDE 40 MEQ: 1500 TABLET, EXTENDED RELEASE ORAL at 23:31

## 2025-07-02 ASSESSMENT — FIBROSIS 4 INDEX: FIB4 SCORE: 0.31

## 2025-07-03 NOTE — ED NOTES
Patient is stable for discharge at this time, anticipatory guidance provided, close follow-up encouraged, and ED return instructions have been discussed. Patient is both agreeable to the disposition and plan and discharged home in ambulatory state, in good condition.      Rx and follow-up education provided, Pt verbalized understanding;

## 2025-07-03 NOTE — DISCHARGE INSTRUCTIONS
You were seen in the emergency department for diarrhea.  Thankfully your labs and physical exam were reassuring.  Please continue to drink plenty of fluids.  You may use the Imodium as needed to help control your diarrhea while you are sleeping or at work.  If you are able to allow the diarrhea to pass, then allow it to pass and only use the Imodium as needed for necessary activities.    You were found to have a low potassium level.  At this time, it does not appear dangerous, however you should eat fruits and vegetables that are high in potassium.  This includes bananas, oranges, avocados, cooked beans, baked potatoes among others.  You can do an Internet search to find foods that are high in potassium.    If your symptoms do not improve over the next week, please follow-up with your primary care provider for further evaluation    Return to the emergency department or seek medical attention if you develop:  Fever, severe abdominal pain, vomiting, bloody stool, any other new or concerning findings   distal radius and ulna styloid fx

## 2025-07-03 NOTE — ED PROVIDER NOTES
"ED Provider Note        CHIEF COMPLAINT  Chief Complaint   Patient presents with    Diarrhea     Started Saturday morning, every 20 mins. Pt is having lower abdominal pain. Pt tried Pepto and it has not helped. Pt is unable to sleep because of this. No recent antibiotic use.          HPI    Wayne Castellanos is a 29 y.o. male who presents to the Emergency Department with 5 days of diarrhea.  The patient reports watery greenish diarrhea.  He is attempted to take Pepto-Bismol which has turned his stool dark but he is continue to have diarrhea despite this.  It is affecting his sleep where he can only sleep for an hour or so at a time and 2 episodes of diarrhea.  Is also affecting his work.  He denies any recent camping, recent foreign travel, history of inflammatory bowel disease, recent antibiotics, fevers, abdominal pain.    REVIEW OF SYSTEMS  See HPI for further details. All other systems are negative.     PAST MEDICAL HISTORY   Past Medical History[1]    SURGICAL HISTORY  Past Surgical History[2]    FAMILY HISTORY  Family History   Problem Relation Age of Onset    Cancer Other     Diabetes Father        SOCIAL HISTORY    reports that he has never smoked. He has never used smokeless tobacco. He reports current alcohol use of about 30.0 oz of alcohol per week. He reports that he does not use drugs.    CURRENT MEDICATIONS  Home Medications       Reviewed by Ganesh Jordan R.N. (Registered Nurse) on 07/02/25 at 2206  Med List Status: Partial     Medication Last Dose Status   hydrOXYzine HCl (ATARAX) 25 MG Tab  Active   nitrofurantoin (MACROBID) 100 MG Cap  Active                    ALLERGIES  Allergies[3]    PHYSICAL EXAM  VITAL SIGNS: /84   Pulse (!) 104   Temp 37.1 °C (98.8 °F) (Temporal)   Resp 16   Ht 1.626 m (5' 4\")   Wt 66.1 kg (145 lb 11.6 oz)   SpO2 96%   BMI 25.01 kg/m²   Gen: Alert, no acute distress  HEENT: ATNC  Eyes: PERRL, EOMI, normal conjunctiva  Neck: trachea midline  Resp: no " respiratory distress  CV: No JVD, regular rate and rhythm  Abd: non-distended, soft, nontender  Ext: No deformities  Neuro: speech fluent    DIAGNOSTIC STUDIES / PROCEDURES      LABS  Labs Reviewed   CBC WITH DIFFERENTIAL - Abnormal; Notable for the following components:       Result Value    RDW 35.8 (*)     MPV 8.5 (*)     All other components within normal limits   COMP METABOLIC PANEL - Abnormal; Notable for the following components:    Potassium 3.3 (*)     All other components within normal limits   LIPASE   ESTIMATED GFR           COURSE & MEDICAL DECISION MAKING  Pertinent Labs & Imaging studies were reviewed. (See chart for details)      EXTERNAL RECORDS REVIEWED  Outpatient Notes most recent outpatient note 2/19/2025 for abnormal urine, chronic neck pain, sleep disorder       INITIAL ASSESSMENT AND PLAN  Care Narrative: patient presents with new onset of watery diarrhea.  No risk factors for clustering difficile diarrhea.  No recent camping for Giardia or Cryptosporidium.  No recent foreign travel for atypical bacterial infection.  Abdomen is benign, low suspicion for diverticulitis.  No history of inflammatory bowel disease.  Labs demonstrate no leukocytosis.  The patient does have slight hypokalemia which was repleted.  Will start the patient on Imodium.  He is counseled on return precautions and following up with his primary care provider as needed    ADDITIONAL PROBLEM LIST AND DISPOSITION        Escalation of care considered, and ultimately not performed: IV fluids and diagnostic imaging.       Decision tools and prescription drugs considered including, but not limited to: Antibiotics not indicated.      Patient is referred to primary care provider for blood pressure, diabetes and all other preventative health services.  Patient was given return precautions, anticipatory guidance, and the opportunity ask questions prior to discharge        FINAL IMPRESSION  1. Diarrhea of presumed infectious origin     2. Hypokalemia           DISPOSITION:  Patient will be discharged home in stable condition.    FOLLOW UP:  Ana Cristina Reyes, MEKHIP.R.N.  [address]  [place] [address] [phone]    Schedule an appointment as soon as possible for a visit   As needed    [facility], Emergency Dept  [address]  [place] [address] [phone]    If symptoms worsen      OUTPATIENT MEDICATIONS:  New Prescriptions    LOPERAMIDE (IMODIUM) 2 MG CAP    Take 1 Capsule by mouth 4 times a day as needed for Diarrhea.          This dictation was created using voice recognition software. The accuracy of the dictation is limited to the abilities of the software. I expect there may be some errors of grammar and possibly content. The nursing notes were reviewed and certain aspects of this information were incorporated into this note.         [1] History reviewed. No pertinent past medical history.  [2] History reviewed. No pertinent surgical history.  [3] No Known Allergies